# Patient Record
(demographics unavailable — no encounter records)

---

## 2024-10-14 NOTE — HISTORY OF PRESENT ILLNESS
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  Patient describes the pain as a ice pick pain localized to her ear in her eyes.  It usually last for 30 minutes and it comes in waves and subsequently a dull headache last for few hours afterwards. Severity 10 out of 10 Associated with neck tightness and photophobia and phonophobia and nausea, no vomiting no TACs It can be triggered by wearing her sports bra or by wearing a book bag or doing things that use her shoulders. Frequency once every 4 to 6 weeks.  Patient reports no seasonal pattern to her headache. Her aura is usually neck tension. She has tried muscle relaxants and anti-inflammatories.  There is no correlation with her menses.  Patient also had an MRI of the cervical spine which was unremarkable.  Patient is status post injections in her neck and physical therapy.  Interval history 2023: Patient states that she continues to have pain that is located in the right eye and feels like ice pick.  Patient states that it also radiates down from the neck to the eye.  Patient describes photophobia and phonophobia.  It still occurs once a month but the severe version comes once every 3 months.  There is no tearing of the eyes or injected conjunctiva or rhinorrhea.  Usually last 30 minutes and is not associated with her menses.  Patient has tried Imitrex however she had a fuzzy leg feeling.   Interval history 2024: Patient states that the indomethacin helps with her headache which are likely due to trigeminal autonomic cephalgia.  Patient states that she usually uses 2-3 doses in a 24-hour period.  Patient states that usually 1 dose helps relieve her symptoms.  Patient's last visit with me was in April as well and there may be a tendency to occur in the spring time.  Interval history 2024: Patient states that she has used the indomethacin 5 times a month.  It continues to help.  We talked about possibly using a preventative such as verapamil and patient was willing to try.  Patient is on Loestrin which interferes with the verapamil but she will hold it as patient was using it for perimenopausal symptoms management.

## 2024-10-14 NOTE — DISCUSSION/SUMMARY
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  Patient states it continues to respond to indomethacin.  Will give a trial of verapamil 40 mg daily for a month to see if that decreases her frequency even further.  Patient can still use indomethacin as needed.  Patient will hold Loestrin for the time being.  Patient understands that she will need to use contraception during this time.  I spent the time noted on the day of this patient encounter preparing for, review of medical records,review of pertinent diagnostic studies, providing and documenting the above E/M service and counseling and educate patient on differential, workup, disease course, and treatment/management. Education was provided to the patient during this encounter. All questions and concerns were answered and addressed in detail. The patient verbalized understanding and agreed to plan. Patient was advised to continue to monitor for neurologic symptoms and to notify my office or go to the nearest emergency room if there are any changes. Any orders/referrals and communications were provided as well. Side effects of the above medications were discussed in detail including but not limited to applicable black box warning and teratogenicity as appropriate. Patient was advised to bring previous records to my office. A copy of the consult note will be sent to the referring physician.

## 2024-10-14 NOTE — PHYSICAL EXAM
SUBJECTIVE:                                                    Trinidad Rodriguez is a 10 year old female, here for a routine health maintenance visit,   accompanied by her mother and sister.    Patient was roomed by: Yaquelin Yanez MA    Do you have any forms to be completed?  no    SOCIAL HISTORY  Child lives with: mother, sister and stepfather  Who takes care of your child: school  Language(s) spoken at home: English  Recent family changes/social stressors: recent move    SAFETY/HEALTH RISK  Is your child around anyone who smokes:  No  TB exposure:  No  Does your child always wear a seat belt?  Yes  Helmet worn for bicycle/roller blades/skateboard?  Yes  Home Safety Survey:    Guns/firearms in the home: No  Is your child ever at home alone:  No  Do you monitor your child's screen use?  Yes    VISION:  Testing not done; patient has seen eye doctor in the past 12 months.    HEARING:  Testing not done, normal hearing test last year, no current hearing concerns.    DENTAL  Dental health HIGH risk factors: none  Water source:  FILTERED WATER    No sports physical needed.    DAILY ACTIVITIES  DIET AND EXERCISE  Does your child get at least 4 helpings of a fruit or vegetable every day: Yes  What does your child drink besides milk and water (and how much?): juice occasionally  Does your child get at least 60 minutes per day of active play, including time in and out of school: Yes  TV in child's bedroom: YES    Dairy/ calcium: whole milk and 1-3 servings daily    SLEEP:  No concerns, sleeps well through night    ELIMINATION  Normal bowel movements and Normal urination    MEDIA  >2 hours/ day    ACTIVITIES:  Age appropriate activities    QUESTIONS/CONCERNS: Patient has sores on the top of her head that comes and goes.     ==================    EDUCATION  Concerns: no  School: [employer]  thGthrthathdtheth:th th5th PROBLEM LIST  There is no problem list on file for this patient.    MEDICATIONS  Current Outpatient  "Prescriptions   Medication Sig Dispense Refill     fluocinonide (LIDEX) 0.05 % cream Apply sparingly to affected area twice daily as needed.  Do not apply to face. 60 g 0      ALLERGY  No Known Allergies    IMMUNIZATIONS  Immunization History   Administered Date(s) Administered     DTAP (<7y) 05/23/2008, 09/24/2013     DTAP/HEPB/POLIO, INACTIVATED <7Y (PEDIARIX) 2007, 2007, 2007     HIB 2007, 2007, 05/23/2008     Hepatitis A Vac Ped/Adol-2 Dose 10/31/2008, 08/03/2009     Hepatitis B 12/15/2015     Human Papilloma Virus 04/07/2017     IPV 09/24/2013     Influenza (IIV3) 2007, 10/31/2008, 11/24/2010     Influenza Intranasal Vaccine 09/24/2013, 12/01/2014     Influenza Intranasal Vaccine 4 valent 12/15/2015     MMR 05/23/2008, 09/24/2013     Pneumococcal (PCV 7) 2007, 2007, 2007, 05/23/2008     Rotavirus 3 Dose 2007, 2007, 2007     Varicella 05/23/2008, 09/24/2013       HEALTH HISTORY SINCE LAST VISIT  No surgery, major illness or injury since last physical exam    MENTAL HEALTH  Screening:  Pediatric Symptom Checklist PASS (score 7  --<28 pass), no followup necessary  No concerns    ROS  GENERAL: See health history, nutrition and daily activities   SKIN: No  rash, hives or significant lesions  HEENT: Hearing/vision: see above.  No eye, nasal, ear symptoms.  RESP: No cough or other concerns  CV: No concerns  GI: See nutrition and elimination.  No concerns.  : See elimination. No concerns  NEURO: No headaches or concerns.    OBJECTIVE:                                                    EXAM  BP 90/60 (BP Location: Left arm, Patient Position: Chair, Cuff Size: Child)  Pulse 86  Temp 97.8  F (36.6  C) (Tympanic)  Ht 4' 8.5\" (1.435 m)  Wt 79 lb 8 oz (36.1 kg)  SpO2 96%  Breastfeeding? No  BMI 17.51 kg/m2  78 %ile based on CDC 2-20 Years stature-for-age data using vitals from 4/7/2017.  67 %ile based on CDC 2-20 Years weight-for-age data using " vitals from 4/7/2017.  60 %ile based on CDC 2-20 Years BMI-for-age data using vitals from 4/7/2017.  Blood pressure percentiles are 9.6 % systolic and 44.5 % diastolic based on NHBPEP's 4th Report.   GENERAL: Active, alert, in no acute distress.  SKIN: Clear. No significant rash, abnormal pigmentation or lesions, small amount of scalp scaling and dryness no lesions noted  HEAD: Normocephalic  EYES: Pupils equal, round, reactive, Extraocular muscles intact. Normal conjunctivae.  EARS: Normal canals. Tympanic membranes are normal; gray and translucent.  NOSE: Normal without discharge.  MOUTH/THROAT: Clear. No oral lesions. Teeth without obvious abnormalities.  NECK: Supple, no masses.  No thyromegaly.  LYMPH NODES: No adenopathy  LUNGS: Clear. No rales, rhonchi, wheezing or retractions  HEART: Regular rhythm. Normal S1/S2. No murmurs. Normal pulses.  ABDOMEN: Soft, non-tender, not distended, no masses or hepatosplenomegaly. Bowel sounds normal.   NEUROLOGIC: No focal findings. Cranial nerves grossly intact: DTR's normal. Normal gait, strength and tone  BACK: Spine is straight, no scoliosis.  EXTREMITIES: Full range of motion, no deformities  -F: Normal female external genitalia, Reji stage 2.   BREASTS:  Reji stage 1  No abnormalities.    ASSESSMENT/PLAN:                                                    1. Encounter for routine child health examination w/o abnormal findings  - BEHAVIORAL / EMOTIONAL ASSESSMENT [72637]    2. Seborrheic dermatitis  - fluocinonide (LIDEX) 0.05 % cream; Apply sparingly to affected area twice daily as needed.  Do not apply to face.  Dispense: 60 g; Refill: 0    3. Encounter for immunization  - ADMIN 1st VACCINE  - HUMAN PAPILLOMA VIRUS (GARDASIL 9) VACCINE    Anticipatory Guidance  The following topics were discussed:  SOCIAL/ FAMILY:  NUTRITION:    Healthy snacks  HEALTH/ SAFETY:    Physical activity    Preventive Care Plan  Immunizations    I provided face to face vaccine  [FreeTextEntry1] : Constitutional: alert. Psychiatric: oriented to person, place, and time. Neurologic:  Orientation: oriented to person, oriented to place and oriented to time.  Memory: short term memory intact.  Language: fluency intact.  Fund of knowledge: displays adequate knowledge of current events.  Cranial Nerves: visual acuity intact bilaterally, visual fields full to confrontation, pupils equal round and reactive to light, extraocular motion intact, facial sensation intact symmetrically, face symmetrical, hearing was intact bilaterally, head turning and shoulder shrug symmetric and there was no tongue deviation with protrusion.  Motor: muscle tone was normal in all four extremities and muscle strength was normal in all four extremities.  Sensory exam: light touch was intact.  Coordination:. Finger to nose dysmetria was not present.  normal gait Deep tendon reflexes: Biceps right 1+. Biceps left 1+. Patella right 1+. Patella left 1+. counseling, answered questions, and explained the benefits and risks of the vaccine components ordered today including:  HPV - Human Papilloma Virus    See orders in EpicCare.  I reviewed the signs and symptoms of adverse effects and when to seek medical care if they should arise.  Referrals/Ongoing Specialty care: No   See other orders in EpicCare.  Cleared for sports:  Yes  BMI at 60 %ile based on CDC 2-20 Years BMI-for-age data using vitals from 4/7/2017.  No weight concerns.  Dental visit recommended: Yes, Continue care every 6 months    FOLLOW-UP: in 1-2 years for a Preventive Care visit    Resources  HPV and Cancer Prevention:  What Parents Should Know  What Kids Should Know About HPV and Cancer  Goal Tracker: Be More Active  Goal Tracker: Less Screen Time  Goal Tracker: Drink More Water  Goal Tracker: Eat More Fruits and Veggies    Jeyson Jansen PA-C  [facility] [place]

## 2024-12-17 NOTE — ASSESSMENT
[FreeTextEntry1] : IMPRESSION: # Total of 8 precancerous polyps removed (mix tubular adenomas/sessile serrated lesions) since 8/2024  -  Colonoscopy by Dr. Sequeira on 12/2024:  Two descending colon polyps removed, measuring less than 10 mm (serrated lesion, lymphoid aggregates)), Three sigmoid colon polyps removed measuring less than 10 mm (sessile serrated lesion). Rectal polyp removed, measuring less than 10 mm (hyperplastic).  Mild sigmoid diverticulosis.  Good prep.  Rpt 1 yrs.  -  Colonoscopy by Dr. Sequeira on 8/2024:  Ascending colon polyp removed measuring 10 mm (sessile serrated lesion).  Four transverse colon polyps removed measuring 5 mm (TA), 10 mm (TA), 9 mm (inflammatory) and 20 mm (serrated lesion).  Two descending colon polyps removed, measuring 10 mm (TA) and 6 mm (serrated lesion).  Sigmoid diverticulosis.    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 - Excessive gassiness and bloating with some diarrhea. She went on a gluten-free diet and her symptoms improved dramatically. - Migraine symptoms improved as well on a gluten-free diet. - EGD by Dr Welsh on 6/2021: Nml esophagus, nml stomach s/p bx (neg path), nml duodenum s/p bx (neg for celiac disease)  # Comorbidities: Cluster headaches   PLAN: Recent colonoscopy reviewed - questions answered.  Colonoscopy in 12/2025 - telephone or telehealth visit 2 to 3 months prior - child to start first colonoscopy at age 40.

## 2024-12-17 NOTE — HISTORY OF PRESENT ILLNESS
[FreeTextEntry1] : 45 y/o mother of one, without any major medical problems who presents for follow up after recent colonoscopy.   Feels well.   Initial office visit was 5/2024:  Patient denies having abdominal pain, constipation, diarrhea, loss of appetite, weight loss, melena and hematochezia. Patient denies having heartburn, regurgitation, difficulty swallowing, painful swallowing, nausea, vomiting. Has gluten intolerance and avoids it. Father had GIST in stomach at age 74. Father then had colon cancer at age 75. Patient denies family history of other gastrointestinal cancers. Occasional headache. All other review of systems are negative. Denies cardiac symptoms.

## 2025-01-23 NOTE — HISTORY OF PRESENT ILLNESS
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  Patient describes the pain as a ice pick pain localized to her ear in her eyes.  It usually last for 30 minutes and it comes in waves and subsequently a dull headache last for few hours afterwards. Severity 10 out of 10 Associated with neck tightness and photophobia and phonophobia and nausea, no vomiting no TACs It can be triggered by wearing her sports bra or by wearing a book bag or doing things that use her shoulders. Frequency once every 4 to 6 weeks.  Patient reports no seasonal pattern to her headache. Her aura is usually neck tension. She has tried muscle relaxants and anti-inflammatories.  There is no correlation with her menses.  Patient also had an MRI of the cervical spine which was unremarkable.  Patient is status post injections in her neck and physical therapy.  Interval history 2023: Patient states that she continues to have pain that is located in the right eye and feels like ice pick.  Patient states that it also radiates down from the neck to the eye.  Patient describes photophobia and phonophobia.  It still occurs once a month but the severe version comes once every 3 months.  There is no tearing of the eyes or injected conjunctiva or rhinorrhea.  Usually last 30 minutes and is not associated with her menses.  Patient has tried Imitrex however she had a fuzzy leg feeling.   Interval history 2024: Patient states that the indomethacin helps with her headache which are likely due to trigeminal autonomic cephalgia.  Patient states that she usually uses 2-3 doses in a 24-hour period.  Patient states that usually 1 dose helps relieve her symptoms.  Patient's last visit with me was in April as well and there may be a tendency to occur in the spring time.  Interval history 2024: Patient states that she has used the indomethacin 5 times a month.  It continues to help.  We talked about possibly using a preventative such as verapamil and patient was willing to try.  Patient is on Loestrin which interferes with the verapamil but she will hold it as patient was using it for perimenopausal symptoms management. Interval history 2025: With the verapamil 40 mg patient takes the indomethacin 5-10 times a month.  It is definitely helping.  We will increase it to 80 mg of verapamil.  Patient was advised that with the increase in the verapamil she may experience lightheadedness and she was advised to exercise caution with the initial few doses.

## 2025-01-23 NOTE — DISCUSSION/SUMMARY
[FreeTextEntry1] : 46-year-old woman who is here for follow-up for her trigeminal autonomic cephalgia.  Patient will increase the verapamil to 80 mg daily to see if that decreases her frequency even further.  Patient will use indomethacin as needed.  Patient will follow-up with me in 6 months.  I spent the time noted on the day of this patient encounter preparing for, review of medical records,review of pertinent diagnostic studies, providing and documenting the above E/M service and counseling and educate patient on differential, workup, disease course, and treatment/management. Education was provided to the patient during this encounter. All questions and concerns were answered and addressed in detail. The patient verbalized understanding and agreed to plan. Patient was advised to continue to monitor for neurologic symptoms and to notify my office or go to the nearest emergency room if there are any changes. Any orders/referrals and communications were provided as well. Side effects of the above medications were discussed in detail including but not limited to applicable black box warning and teratogenicity as appropriate. Patient was advised to bring previous records to my office. A copy of the consult note will be sent to the referring physician.

## 2025-03-06 NOTE — HISTORY OF PRESENT ILLNESS
[FreeTextEntry1] : 46 year old patent presents to the office for a nasal bridge lesion, present for 13 years.  Patient has been seen by Dermatology. No previous treatments Bleeding and scabbing when patient blows nose or area is rubbed.  No Imaging/Biopsy. Slowly growing, no change in color. No Infection or inflammation. No pain or discomfort. No personal hx of masses, moles, lesions.  No family hx of skin cancer. 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

## 2025-04-16 NOTE — HISTORY OF PRESENT ILLNESS
[FreeTextEntry1] : DOP 04/08/25 excision and biopsy mass on left nasal ala. Final Diagnosis Nasal bridge L, excision:      - Fibrous papule

## 2025-07-24 NOTE — HISTORY OF PRESENT ILLNESS
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  Patient describes the pain as a ice pick pain localized to her ear in her eyes.  It usually last for 30 minutes and it comes in waves and subsequently a dull headache last for few hours afterwards. Severity 10 out of 10 Associated with neck tightness and photophobia and phonophobia and nausea, no vomiting no TACs It can be triggered by wearing her sports bra or by wearing a book bag or doing things that use her shoulders. Frequency once every 4 to 6 weeks.  Patient reports no seasonal pattern to her headache. Her aura is usually neck tension. She has tried muscle relaxants and anti-inflammatories.  There is no correlation with her menses.  Patient also had an MRI of the cervical spine which was unremarkable.  Patient is status post injections in her neck and physical therapy.  Interval history 2023: Patient states that she continues to have pain that is located in the right eye and feels like ice pick.  Patient states that it also radiates down from the neck to the eye.  Patient describes photophobia and phonophobia.  It still occurs once a month but the severe version comes once every 3 months.  There is no tearing of the eyes or injected conjunctiva or rhinorrhea.  Usually last 30 minutes and is not associated with her menses.  Patient has tried Imitrex however she had a fuzzy leg feeling.   Interval history 2024: Patient states that the indomethacin helps with her headache which are likely due to trigeminal autonomic cephalgia.  Patient states that she usually uses 2-3 doses in a 24-hour period.  Patient states that usually 1 dose helps relieve her symptoms.  Patient's last visit with me was in April as well and there may be a tendency to occur in the spring time.  Interval history 2024: Patient states that she has used the indomethacin 5 times a month.  It continues to help.  We talked about possibly using a preventative such as verapamil and patient was willing to try.  Patient is on Loestrin which interferes with the verapamil but she will hold it as patient was using it for perimenopausal symptoms management. Interval history 2025: With the verapamil 40 mg patient takes the indomethacin 5-10 times a month.  It is definitely helping.  We will increase it to 80 mg of verapamil.  Patient was advised that with the increase in the verapamil she may experience lightheadedness and she was advised to exercise caution with the initial few doses.  Interval history 2025: Patient is doing well on verapamil 80 mg with no side effects.  Patient has no new medications and she takes the indomethacin as needed 1 to twice a month.

## 2025-07-24 NOTE — DISCUSSION/SUMMARY
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  Patient will continue the verapamil 80 mg daily for prophylaxis and indomethacin as needed.  Patient will follow-up with me in 6 to 8 months.  I spent the time noted on the day of this patient encounter preparing for, review of medical records,review of pertinent diagnostic studies, providing and documenting the above E/M service and counseling and educate patient on differential, workup, disease course, and treatment/management. Education was provided to the patient during this encounter. All questions and concerns were answered and addressed in detail. The patient verbalized understanding and agreed to plan. Patient was advised to continue to monitor for neurologic symptoms and to notify my office or go to the nearest emergency room if there are any changes. Any orders/referrals and communications were provided as well. Side effects of the above medications were discussed in detail including but not limited to applicable black box warning and teratogenicity as appropriate. Patient was advised to bring previous records to my office. A copy of the consult note will be sent to the referring physician.

## 2025-07-24 NOTE — PHYSICAL EXAM
[FreeTextEntry1] : Constitutional: alert. Psychiatric: oriented to person, place, and time. Neurologic:  Orientation: oriented to person, oriented to place and oriented to time.  Memory: short term memory intact.  Language: fluency intact.  Fund of knowledge: displays adequate knowledge of current events.  Cranial Nerves: visual acuity intact bilaterally, visual fields full to confrontation, pupils equal round and reactive to light, extraocular motion intact, facial sensation intact symmetrically, face symmetrical, hearing was intact bilaterally, head turning and shoulder shrug symmetric and there was no tongue deviation with protrusion.  Motor: muscle tone was normal in all four extremities and muscle strength was normal in all four extremities.  Sensory exam: light touch was intact.  Coordination:. Finger to nose dysmetria was not present.  normal gait Deep tendon reflexes: Biceps right 1+. Biceps left 1+. Patella right 1+. Patella left 1+.